# Patient Record
Sex: FEMALE | Race: WHITE | NOT HISPANIC OR LATINO | Employment: FULL TIME | URBAN - METROPOLITAN AREA
[De-identification: names, ages, dates, MRNs, and addresses within clinical notes are randomized per-mention and may not be internally consistent; named-entity substitution may affect disease eponyms.]

---

## 2017-04-06 ENCOUNTER — HOSPITAL ENCOUNTER (EMERGENCY)
Facility: CLINIC | Age: 32
Discharge: HOME | End: 2017-04-06
Attending: EMERGENCY MEDICINE | Admitting: EMERGENCY MEDICINE
Payer: COMMERCIAL

## 2017-04-06 ENCOUNTER — APPOINTMENT (OUTPATIENT)
Dept: CT IMAGING | Facility: CLINIC | Age: 32
End: 2017-04-06
Attending: EMERGENCY MEDICINE
Payer: COMMERCIAL

## 2017-04-06 VITALS
DIASTOLIC BLOOD PRESSURE: 74 MMHG | WEIGHT: 180 LBS | BODY MASS INDEX: 25.83 KG/M2 | OXYGEN SATURATION: 95 % | HEART RATE: 86 BPM | RESPIRATION RATE: 20 BRPM | TEMPERATURE: 97.8 F | SYSTOLIC BLOOD PRESSURE: 117 MMHG

## 2017-04-06 DIAGNOSIS — J06.9 VIRAL URI: ICD-10-CM

## 2017-04-06 DIAGNOSIS — H10.31 ACUTE CONJUNCTIVITIS OF RIGHT EYE, UNSPECIFIED ACUTE CONJUNCTIVITIS TYPE: ICD-10-CM

## 2017-04-06 PROCEDURE — 99284 EMERGENCY DEPT VISIT MOD MDM: CPT | Mod: 25

## 2017-04-06 PROCEDURE — 70480 CT ORBIT/EAR/FOSSA W/O DYE: CPT

## 2017-04-06 RX ORDER — CIPROFLOXACIN HYDROCHLORIDE 3.5 MG/ML
1 SOLUTION/ DROPS TOPICAL
Qty: 1 BOTTLE | Refills: 0 | Status: SHIPPED | OUTPATIENT
Start: 2017-04-06 | End: 2017-04-13

## 2017-04-06 ASSESSMENT — ENCOUNTER SYMPTOMS
EYE DISCHARGE: 1
SHORTNESS OF BREATH: 0
SINUS PRESSURE: 1
FEVER: 1
HEADACHES: 1
SORE THROAT: 1
COUGH: 1
EYE PAIN: 0

## 2017-04-06 NOTE — ED AVS SNAPSHOT
[facility] Emergency Department    [address]    [facility] [address]    Phone:  [phone]    Fax:  [phone]                                       Anuradha Llanos   MRN: [id number]    Department:  [facility] Emergency Department   Date of Visit:  4/6/2017           Patient Information     Date Of Birth          1985        Your diagnoses for this visit were:     Viral URI     Acute conjunctivitis of right eye, unspecified acute conjunctivitis type        You were seen by Mayito Muller MD.      Follow-up Information     Follow up with Melissa Dallas MD.    Specialty:  OB/Gyn    Why:  As needed    Contact information:    [facility]  [address]  [place] [address]  [phone]          Follow up with [facility] Emergency Department.    Specialty:  EMERGENCY MEDICINE    Why:  if worsening pain, vision change, double vision, or severe pain with eye movements    Contact information:    [address] [facility] [address] [phone]        Discharge Instructions         Conjunctivitis, Nonspecific    The membrane that covers the white part of your eye (the conjunctiva) is inflamed. Inflammation happens when your body responds to an injury, allergic reaction, infection, or illness. Symptoms of inflammation in the eye may include redness, irritation, itching, swelling, or burning. These symptoms should go away within the next 24 hours. Conjunctivitis may be related to a particle that was in your eye. If so, it may wash out with your tears or irrigation treatment. Being exposed to liquid chemicals or fumes may also cause this reaction.   Home care    Apply a cold pack (ice in a plastic bag, wrapped in a towel) over the eye for 20 minutes at a time. This will reduce pain.    Artificial tears may be prescribed to reduce irritation or redness.  These should be used 3 to 4 times a day.    You may use acetaminophen or  ibuprofen to control pain, unless another medicine was prescribed.(Note: If you have chronic liver or kidney disease, or if you have ever had a stomach ulcer or gastrointestinal bleeding, talk with your healthcare provider before using these medicines.)  Follow-up care  Follow up with your healthcare provider, or as advised.  When to seek medical advice  Call your healthcare provider right away if any of these occur:    Increased eyelid swelling    Increased eye pain    Increased redness or drainage from the eye    Increased blurry vision or increased sensitivity to light    Failure of normal vision to return within 24 to 48 hours    6550-0880 [employer] [address] [place] [address]. All rights reserved. This information is not intended as a substitute for professional medical care. Always follow your healthcare professional's instructions.          Discharge References/Attachments     URI, VIRAL, NO ABX (ADULT) (ENGLISH)      24 Hour Appointment Hotline       To make an appointment at any [facility], call [phone] ([phone]). If you don't have a family doctor or clinic, we will help you find one. [facility] clinics are conveniently located to serve the needs of you and your family.             Review of your medicines      START taking        Dose / Directions Last dose taken    ciprofloxacin 0.3 % ophthalmic solution   Commonly known as:  CILOXAN   Dose:  1 drop   Quantity:  1 Bottle        Apply 1 drop to eye every 4 hours (while awake) for 7 days   Refills:  0          Our records show that you are taking the medicines listed below. If these are incorrect, please call your family doctor or clinic.        Dose / Directions Last dose taken    polyethylene glycol Packet   Commonly known as:  MIRALAX/GLYCOLAX   Dose:  1 packet        Take 1 packet by mouth daily   Refills:  0        PRENATAL PO        Refills:  0                Prescriptions were sent or printed at these  locations (1 Prescription)                   Other Prescriptions                Printed at Department/Unit printer (1 of 1)         ciprofloxacin (CILOXAN) 0.3 % ophthalmic solution                Procedures and tests performed during your visit     CT Temporal Orbital Sella w/o Contrast      Orders Needing Specimen Collection     None      Pending Results     No orders found from 4/4/2017 to 4/7/2017.            Pending Culture Results     No orders found from 4/4/2017 to 4/7/2017.            Test Results From Your Hospital Stay        4/6/2017 10:55 PM      Narrative     CT OF THE ORBITS AND FACE WITHOUT CONTRAST   4/6/2017 10:23 PM     HISTORY: Pain behind right eye.    TECHNIQUE:  Axial scans and coronal reformations without IV nonionic  iodinated contrast material.    COMPARISON: None.    FINDINGS: There is minimal scattered mucosal thickening in the ethmoid  air cells and maxillary sinuses bilaterally but there is no evidence  for acute sinusitis. There are no fractures of the visualized bones.  The orbits bilaterally are unremarkable with no evidence for mass,  fluid collection or inflammation. The globes bilaterally are  unremarkable.        Impression     IMPRESSION:  1. Minimal scattered mucosal thickening in the ethmoid and maxillary  sinuses on both sides but no evidence for acute sinusitis.  2. Otherwise, normal orbit CT.    Radiation dose for this scan was reduced using automated exposure  control, adjustment of the mA and/or kV according to patient size, or  iterative reconstruction technique.     PRAVIN TELLEZ MD                Clinical Quality Measure: Blood Pressure Screening     Your blood pressure was checked while you were in the emergency department today. The last reading we obtained was  BP: (!) 148/100 . Please read the guidelines below about what these numbers mean and what you should do about them.  If your systolic blood pressure (the top number) is less than 120 and your diastolic blood  "pressure (the bottom number) is less than 80, then your blood pressure is normal. There is nothing more that you need to do about it.  If your systolic blood pressure (the top number) is 120-139 or your diastolic blood pressure (the bottom number) is 80-89, your blood pressure may be higher than it should be. You should have your blood pressure rechecked within a year by a primary care provider.  If your systolic blood pressure (the top number) is 140 or greater or your diastolic blood pressure (the bottom number) is 90 or greater, you may have high blood pressure. High blood pressure is treatable, but if left untreated over time it can put you at risk for heart attack, stroke, or kidney failure. You should have your blood pressure rechecked by a primary care provider within the next 4 weeks.  If your provider in the emergency department today gave you specific instructions to follow-up with your doctor or provider even sooner than that, you should follow that instruction and not wait for up to 4 weeks for your follow-up visit.        Thank you for choosing [facility]       Thank you for choosing [facility] for your care. Our goal is always to provide you with excellent care. Hearing back from our patients is one way we can continue to improve our services. Please take a few minutes to complete the written survey that you may receive in the mail after you visit with us. Thank you!        [employer] Information     [employer] lets you send messages to your doctor, view your test results, renew your prescriptions, schedule appointments and more. To sign up, go to [email or web address] . Click on \"Log in\" on the left side of the screen, which will take you to the Welcome page. Then click on \"Sign up Now\" on the right side of the page.     You will be asked to enter the access code listed below, as well as some personal information. Please follow the directions to create your username and password.     Your access code is: " [email or web address]  Expires: 2017 11:01 PM     Your access code will  in 90 days. If you need help or a new code, please call your [facility] or [phone].        Care EveryWhere ID     This is your Care EveryWhere ID. This could be used by other organizations to access your [facility] medical records  [id number]        After Visit Summary       This is your record. Keep this with you and show to your community pharmacist(s) and doctor(s) at your next visit.

## 2017-04-06 NOTE — ED AVS SNAPSHOT
[facility] Emergency Department    [address]    [facility] [address]    Phone:  [phone]    Fax:  [phone]                                       Anuradha Llanos   MRN: [id number]    Department:  [facility] Emergency Department   Date of Visit:  4/6/2017           After Visit Summary Signature Page     I have received my discharge instructions, and my questions have been answered. I have discussed any challenges I see with this plan with the nurse or doctor.    ..........................................................................................................................................  Patient/Patient Representative Signature      ..........................................................................................................................................  Patient Representative Print Name and Relationship to Patient    ..................................................               ................................................  Date                                            Time    ..........................................................................................................................................  Reviewed by Signature/Title    ...................................................              ..............................................  Date                                                            Time

## 2017-04-07 NOTE — DISCHARGE INSTRUCTIONS
Conjunctivitis, Nonspecific    The membrane that covers the white part of your eye (the conjunctiva) is inflamed. Inflammation happens when your body responds to an injury, allergic reaction, infection, or illness. Symptoms of inflammation in the eye may include redness, irritation, itching, swelling, or burning. These symptoms should go away within the next 24 hours. Conjunctivitis may be related to a particle that was in your eye. If so, it may wash out with your tears or irrigation treatment. Being exposed to liquid chemicals or fumes may also cause this reaction.   Home care    Apply a cold pack (ice in a plastic bag, wrapped in a towel) over the eye for 20 minutes at a time. This will reduce pain.    Artificial tears may be prescribed to reduce irritation or redness.  These should be used 3 to 4 times a day.    You may use acetaminophen or ibuprofen to control pain, unless another medicine was prescribed.(Note: If you have chronic liver or kidney disease, or if you have ever had a stomach ulcer or gastrointestinal bleeding, talk with your healthcare provider before using these medicines.)  Follow-up care  Follow up with your healthcare provider, or as advised.  When to seek medical advice  Call your healthcare provider right away if any of these occur:    Increased eyelid swelling    Increased eye pain    Increased redness or drainage from the eye    Increased blurry vision or increased sensitivity to light    Failure of normal vision to return within 24 to 48 hours    6716-2651 The [employer]. [address] [place] [address], [place] [address]. All rights reserved. This information is not intended as a substitute for professional medical care. Always follow your healthcare professional's instructions.

## 2017-04-07 NOTE — ED NOTES
Right eye pain and drainage after blowing nose this morning. Patient states she has been sick with a cold since Monday. Patient states she has a headache behind right eye. ABC intact alert and no distress.

## 2017-04-07 NOTE — ED PROVIDER NOTES
History     Chief Complaint:  Eye Problem and Headache    HPI   Anuradha Llanos is a 32 year old female who presents to the emergency department today for evaluation of right eye drainage. Patient reports cold symptoms that began 4 days ago with sore throat, and later congestions, sinus pressure, and cough. Patient also reports particularly significant mucous drainage experienced suddenly after blowing nose that was followed by a severe headache; she notes that eye discharge and headache was not present before blowing her nose. Tylenol last taken at 2000 provided temporary headache relief. Patient notes recent ill contacts with sick kids. Patient reports no chest pain or shortness of breath. She also denies pain with movement of her eye. No other concerns were voiced at this time.     Allergies:  Morphine    Medications:    Miralax    Past Medical History:    Abnormal pap smear  High cholesterol     Past Surgical History:    Colonoscopy  Tonsillectomy  Leep Tx    Family History:    History reviewed. No pertinent family history.     Social History:  The patient was accompanied to the ED by no one.  Smoking Status: Former smoker  Alcohol Use: Negative  Marital Status:   [2]    Review of Systems   Constitutional: Positive for fever.   HENT: Positive for congestion, sinus pressure and sore throat.    Eyes: Positive for discharge. Negative for pain.   Respiratory: Positive for cough. Negative for shortness of breath.    Cardiovascular: Negative for chest pain.   Neurological: Positive for headaches.   All other systems reviewed and are negative.    Physical Exam   First Vitals:  BP: (!) 148/100  Pulse: 86  Temp: 97.8  F (36.6  C)  Resp: 20  Weight: 81.6 kg (180 lb)  SpO2: 100 %    Physical Exam  Vital signs and nursing notes reviewed.     Constitutional: laying on gurney appears mild to moderately uncomfortable  HENT: Oropharynx is clear and moist. No obvious lesions noted Nasal congestions.   Eyes: Pupils equal.  Right conjunctival ejection. No obvious drainage. Extraocular movements are normal and without any pain.   Neck: normal range of motion. No pain with movement.   Cardiovascular: Normal rate, regular rhythm, normal heart sounds.   Pulmonary/Chest: Effort normal and breath sounds normal. No respiratory distress.   Abdominal: Soft. Bowel sounds are normal. No tenderness to palpation. No rebound or guarding.   Musculoskeletal: No joint swelling or edema.   Neurological: Alert and oriented. No focal weakness  Skin: Skin is warm and dry. No rash noted.   Psych: normal affect    Emergency Department Course     Imaging:  Radiology findings were communicated with the patient who voiced understanding of the findings.    CT temporal orbital sella w/o contrast:  1. Minimal scattered mucosal thickening in the ethmoid and maxillary  sinuses on both sides but no evidence for acute sinusitis.  2. Otherwise, normal orbit CT.  Reading per radiology     Emergency Department Course:  Nursing notes and vitals reviewed.  I performed an exam of the patient as documented above.   The patient was sent for a CT temporal sella while in the emergency department, results above.     At 2254 the patient was updated on imaging results and we discussed plan of care.     I personally reviewed the treatment plan  with the Patient and answered all related questions prior to discharge.    Impression & Plan      Medical Decision Making:  Anuradha Llanos is a 32 year old female who presents today with symptoms after blowing her nose with significant force, developing sudden pain behind her right. It sounds like she developed viral URI symptoms for the last couple of days. She also developed redness to the right eye with drainage after this episode. Patient did have findings of right eye conjunctivitis. There are no signs of proptosis or pain with movement of the eyes. She had normal vision and pupillary response. Due to the acute onset, nature, and  description of her pain I wanted to make sure that there is nothing retroorbital causing these symptoms. Therefore CT of the orbit was obtained. Thankfully this did not shows any significant abnormalities. There is some mild ethmoid and sphenoid sinus inflammation. I discussed with her at length about her findings and symptoms. I recommended that she not blow her nose and that oral antibiotics are not warranted at this time. She is to watch for increased redness or swelling around the eyes or if she notes any pain with looking in different directions. She is to return immediately. She is given Cipro eye drops for her conjunctivitis and she was advised to have good handwashing.     Diagnosis:    ICD-10-CM    1. Viral URI J06.9     B97.89    2. Acute conjunctivitis of right eye, unspecified acute conjunctivitis type H10.31        Disposition:   Discharged to home. Plan for follow up with Melissa Dallas    Discharge Medications:  New Prescriptions    CIPROFLOXACIN (CILOXAN) 0.3 % OPHTHALMIC SOLUTION    Apply 1 drop to eye every 4 hours (while awake) for 7 days       Scribe Disclosure:  I, Rodriguez Antunez, am serving as a scribe at 10:00 PM on 4/6/2017 to document services personally performed by Mayito Muller MD, based on my observations and the provider's statements to me.  [facility] EMERGENCY DEPARTMENT       Mayito Muller MD  04/07/17 [id number]

## 2017-04-11 ENCOUNTER — CARE COORDINATION (OUTPATIENT)
Dept: CASE MANAGEMENT | Facility: CLINIC | Age: 32
End: 2017-04-11

## 2018-04-11 LAB
HBV SURFACE AG SERPL QL IA: NORMAL
HIV 1+2 AB+HIV1 P24 AG SERPL QL IA: NORMAL
RUBELLA ABY IGG: NORMAL

## 2018-10-10 LAB
GROUP B STREP PCR: NEGATIVE

## 2018-11-14 ENCOUNTER — HOSPITAL ENCOUNTER (INPATIENT)
Facility: CLINIC | Age: 33
LOS: 2 days | Discharge: HOME | End: 2018-11-16
Attending: OBSTETRICS & GYNECOLOGY | Admitting: OBSTETRICS & GYNECOLOGY
Payer: COMMERCIAL

## 2018-11-14 LAB
ABO + RH BLD: ABNORMAL
BLD GP AB INVEST PLASRBC-IMP: ABNORMAL
BLD GP AB SCN SERPL QL: ABNORMAL
BLD GP AB SCN SERPL QL: ABNORMAL
BLD PROD TYP BPU: ABNORMAL
BLOOD BANK CMNT PATIENT-IMP: ABNORMAL
BLOOD BANK CMNT PATIENT-IMP: NORMAL
HGB BLD-MCNC: 12.6 G/DL (ref 11.7–15.7)
NUM BPU REQUESTED: 2
SPECIMEN EXP DATE BLD: ABNORMAL
SPECIMEN EXP DATE BLD: ABNORMAL

## 2018-11-14 PROCEDURE — 12000027 ZZH R&B OB

## 2018-11-14 PROCEDURE — 25000128 H RX IP 250 OP 636: Performed by: OBSTETRICS & GYNECOLOGY

## 2018-11-14 PROCEDURE — 10907ZC DRAINAGE OF AMNIOTIC FLUID, THERAPEUTIC FROM PRODUCTS OF CONCEPTION, VIA NATURAL OR ARTIFICIAL OPENING: ICD-10-PCS | Performed by: OBSTETRICS & GYNECOLOGY

## 2018-11-14 PROCEDURE — 36415 COLL VENOUS BLD VENIPUNCTURE: CPT | Performed by: OBSTETRICS & GYNECOLOGY

## 2018-11-14 PROCEDURE — 25000132 ZZH RX MED GY IP 250 OP 250 PS 637

## 2018-11-14 PROCEDURE — 85018 HEMOGLOBIN: CPT | Performed by: OBSTETRICS & GYNECOLOGY

## 2018-11-14 PROCEDURE — 0KQM0ZZ REPAIR PERINEUM MUSCLE, OPEN APPROACH: ICD-10-PCS | Performed by: OBSTETRICS & GYNECOLOGY

## 2018-11-14 PROCEDURE — 86900 BLOOD TYPING SEROLOGIC ABO: CPT | Performed by: OBSTETRICS & GYNECOLOGY

## 2018-11-14 PROCEDURE — 86780 TREPONEMA PALLIDUM: CPT | Performed by: OBSTETRICS & GYNECOLOGY

## 2018-11-14 PROCEDURE — 86901 BLOOD TYPING SEROLOGIC RH(D): CPT | Performed by: OBSTETRICS & GYNECOLOGY

## 2018-11-14 PROCEDURE — 25000125 ZZHC RX 250: Performed by: OBSTETRICS & GYNECOLOGY

## 2018-11-14 PROCEDURE — 25000132 ZZH RX MED GY IP 250 OP 250 PS 637: Performed by: OBSTETRICS & GYNECOLOGY

## 2018-11-14 PROCEDURE — 86850 RBC ANTIBODY SCREEN: CPT | Performed by: OBSTETRICS & GYNECOLOGY

## 2018-11-14 PROCEDURE — 86870 RBC ANTIBODY IDENTIFICATION: CPT | Performed by: OBSTETRICS & GYNECOLOGY

## 2018-11-14 PROCEDURE — 86922 COMPATIBILITY TEST ANTIGLOB: CPT | Performed by: OBSTETRICS & GYNECOLOGY

## 2018-11-14 PROCEDURE — 72200001 ZZH LABOR CARE VAGINAL DELIVERY SINGLE

## 2018-11-14 RX ORDER — METHYLERGONOVINE MALEATE 0.2 MG/ML
200 INJECTION INTRAVENOUS
Status: COMPLETED | OUTPATIENT
Start: 2018-11-14 | End: 2018-11-14

## 2018-11-14 RX ORDER — CALCIUM CARBONATE 500 MG/1
1 TABLET, CHEWABLE ORAL 2 TIMES DAILY
COMMUNITY

## 2018-11-14 RX ORDER — AMOXICILLIN 250 MG
1 CAPSULE ORAL 2 TIMES DAILY
Status: DISCONTINUED | OUTPATIENT
Start: 2018-11-14 | End: 2018-11-16 | Stop reason: HOSPADM

## 2018-11-14 RX ORDER — LANOLIN 100 %
OINTMENT (GRAM) TOPICAL
Status: DISCONTINUED | OUTPATIENT
Start: 2018-11-14 | End: 2018-11-16 | Stop reason: HOSPADM

## 2018-11-14 RX ORDER — AMOXICILLIN 250 MG
2 CAPSULE ORAL 2 TIMES DAILY
Status: DISCONTINUED | OUTPATIENT
Start: 2018-11-14 | End: 2018-11-16 | Stop reason: HOSPADM

## 2018-11-14 RX ORDER — MISOPROSTOL 200 UG/1
800 TABLET ORAL ONCE
Status: COMPLETED | OUTPATIENT
Start: 2018-11-14 | End: 2018-11-14

## 2018-11-14 RX ORDER — OXYCODONE AND ACETAMINOPHEN 5; 325 MG/1; MG/1
1 TABLET ORAL
Status: DISCONTINUED | OUTPATIENT
Start: 2018-11-14 | End: 2018-11-14

## 2018-11-14 RX ORDER — OXYTOCIN 10 [USP'U]/ML
10 INJECTION, SOLUTION INTRAMUSCULAR; INTRAVENOUS
Status: DISCONTINUED | OUTPATIENT
Start: 2018-11-14 | End: 2018-11-14

## 2018-11-14 RX ORDER — OXYTOCIN/0.9 % SODIUM CHLORIDE 30/500 ML
100 PLASTIC BAG, INJECTION (ML) INTRAVENOUS CONTINUOUS
Status: DISCONTINUED | OUTPATIENT
Start: 2018-11-14 | End: 2018-11-16 | Stop reason: HOSPADM

## 2018-11-14 RX ORDER — FENTANYL CITRATE 50 UG/ML
50-100 INJECTION, SOLUTION INTRAMUSCULAR; INTRAVENOUS
Status: DISCONTINUED | OUTPATIENT
Start: 2018-11-14 | End: 2018-11-14

## 2018-11-14 RX ORDER — OXYTOCIN/0.9 % SODIUM CHLORIDE 30/500 ML
100-340 PLASTIC BAG, INJECTION (ML) INTRAVENOUS CONTINUOUS PRN
Status: DISCONTINUED | OUTPATIENT
Start: 2018-11-14 | End: 2018-11-14

## 2018-11-14 RX ORDER — IBUPROFEN 800 MG/1
800 TABLET, FILM COATED ORAL
Status: DISCONTINUED | OUTPATIENT
Start: 2018-11-14 | End: 2018-11-14

## 2018-11-14 RX ORDER — IBUPROFEN 800 MG/1
800 TABLET, FILM COATED ORAL EVERY 6 HOURS PRN
Status: DISCONTINUED | OUTPATIENT
Start: 2018-11-14 | End: 2018-11-16 | Stop reason: HOSPADM

## 2018-11-14 RX ORDER — ACETAMINOPHEN 325 MG/1
650 TABLET ORAL EVERY 4 HOURS PRN
Status: DISCONTINUED | OUTPATIENT
Start: 2018-11-14 | End: 2018-11-16 | Stop reason: HOSPADM

## 2018-11-14 RX ORDER — CARBOPROST TROMETHAMINE 250 UG/ML
250 INJECTION, SOLUTION INTRAMUSCULAR
Status: DISCONTINUED | OUTPATIENT
Start: 2018-11-14 | End: 2018-11-14

## 2018-11-14 RX ORDER — MISOPROSTOL 200 UG/1
400 TABLET ORAL
Status: DISCONTINUED | OUTPATIENT
Start: 2018-11-14 | End: 2018-11-16 | Stop reason: HOSPADM

## 2018-11-14 RX ORDER — METHYLERGONOVINE MALEATE 0.2 MG/ML
200 INJECTION INTRAVENOUS
Status: DISCONTINUED | OUTPATIENT
Start: 2018-11-14 | End: 2018-11-16 | Stop reason: HOSPADM

## 2018-11-14 RX ORDER — SODIUM CHLORIDE, SODIUM LACTATE, POTASSIUM CHLORIDE, CALCIUM CHLORIDE 600; 310; 30; 20 MG/100ML; MG/100ML; MG/100ML; MG/100ML
INJECTION, SOLUTION INTRAVENOUS CONTINUOUS
Status: DISCONTINUED | OUTPATIENT
Start: 2018-11-14 | End: 2018-11-14

## 2018-11-14 RX ORDER — OXYTOCIN 10 [USP'U]/ML
10 INJECTION, SOLUTION INTRAMUSCULAR; INTRAVENOUS
Status: DISCONTINUED | OUTPATIENT
Start: 2018-11-14 | End: 2018-11-16 | Stop reason: HOSPADM

## 2018-11-14 RX ORDER — BISACODYL 10 MG
10 SUPPOSITORY, RECTAL RECTAL DAILY PRN
Status: DISCONTINUED | OUTPATIENT
Start: 2018-11-16 | End: 2018-11-16 | Stop reason: HOSPADM

## 2018-11-14 RX ORDER — OXYTOCIN/0.9 % SODIUM CHLORIDE 30/500 ML
340 PLASTIC BAG, INJECTION (ML) INTRAVENOUS CONTINUOUS PRN
Status: DISCONTINUED | OUTPATIENT
Start: 2018-11-14 | End: 2018-11-16 | Stop reason: HOSPADM

## 2018-11-14 RX ORDER — ACETAMINOPHEN 325 MG/1
650 TABLET ORAL EVERY 4 HOURS PRN
Status: DISCONTINUED | OUTPATIENT
Start: 2018-11-14 | End: 2018-11-14

## 2018-11-14 RX ORDER — MISOPROSTOL 200 UG/1
TABLET ORAL
Status: COMPLETED
Start: 2018-11-14 | End: 2018-11-14

## 2018-11-14 RX ORDER — HYDROCORTISONE 2.5 %
CREAM (GRAM) TOPICAL 3 TIMES DAILY PRN
Status: DISCONTINUED | OUTPATIENT
Start: 2018-11-14 | End: 2018-11-16 | Stop reason: HOSPADM

## 2018-11-14 RX ORDER — ONDANSETRON 2 MG/ML
4 INJECTION INTRAMUSCULAR; INTRAVENOUS EVERY 6 HOURS PRN
Status: DISCONTINUED | OUTPATIENT
Start: 2018-11-14 | End: 2018-11-14

## 2018-11-14 RX ORDER — NALOXONE HYDROCHLORIDE 0.4 MG/ML
.1-.4 INJECTION, SOLUTION INTRAMUSCULAR; INTRAVENOUS; SUBCUTANEOUS
Status: DISCONTINUED | OUTPATIENT
Start: 2018-11-14 | End: 2018-11-16 | Stop reason: HOSPADM

## 2018-11-14 RX ORDER — NALOXONE HYDROCHLORIDE 0.4 MG/ML
.1-.4 INJECTION, SOLUTION INTRAMUSCULAR; INTRAVENOUS; SUBCUTANEOUS
Status: DISCONTINUED | OUTPATIENT
Start: 2018-11-14 | End: 2018-11-14

## 2018-11-14 RX ADMIN — MISOPROSTOL 800 MCG: 200 TABLET ORAL at 11:40

## 2018-11-14 RX ADMIN — SENNOSIDES AND DOCUSATE SODIUM 1 TABLET: 8.6; 5 TABLET ORAL at 19:52

## 2018-11-14 RX ADMIN — LIDOCAINE HYDROCHLORIDE 10 ML: 10 INJECTION, SOLUTION EPIDURAL; INFILTRATION; INTRACAUDAL; PERINEURAL at 11:38

## 2018-11-14 RX ADMIN — IBUPROFEN 800 MG: 800 TABLET ORAL at 12:29

## 2018-11-14 RX ADMIN — METHYLERGONOVINE MALEATE 200 MCG: 0.2 INJECTION, SOLUTION INTRAMUSCULAR; INTRAVENOUS at 11:57

## 2018-11-14 NOTE — PLAN OF CARE
Problem: Patient Care Overview  Goal: Plan of Care/Patient Progress Review  Outcome: No Change  Pt is oriented to room by KIMBERLEY Day, no epidural, was moving from w/c to bed well by herself, has had few clots after delivery and white container is in the bathroom for pt to void and catch clots per Dr Zapata, bonding with infant well, family is visiting now.

## 2018-11-14 NOTE — PROGRESS NOTES
male infant    placenta intact  Small 2nd MLtear repaired  QEBL pending  Pt requested no IV  She was given Cytotec 800 mcg intrarectally and due to uterine atony received methergine 0.2mg IM

## 2018-11-14 NOTE — IP AVS SNAPSHOT
MRN:[id number]                      After Visit Summary   11/14/2018    Anuradha Llanos    MRN: [id number]           Thank you!     Thank you for choosing [facility] for your care. Our goal is always to provide you with excellent care. Hearing back from our patients is one way we can continue to improve our services. Please take a few minutes to complete the written survey that you may receive in the mail after you visit. If you would like to speak to someone directly about your visit please contact Patient Relations at [phone]. Thank you!          Patient Information     Date Of Birth          1985        About your hospital stay     You were admitted on:  November 14, 2018 You last received care in the:  [facility]    You were discharged on:  November 16, 2018        Reason for your hospital stay       Maternity care                  Who to Call     For medical emergencies, please call 911.  For non-urgent questions about your medical care, please call your primary care provider or clinic, [phone]          Attending Provider     Provider Specialty    Elyssa Zapata MD OB/Gyn       Primary Care Provider Office Phone # Fax #    Melissa Dallas [phone]      After Care Instructions     Activity       Review discharge instructions            Diet       Resume previous diet            Discharge Instructions - Postpartum visit       Schedule postpartum visit with your provider and return to clinic in 6 weeks.                  Further instructions from your care team       Postpartum Vaginal Delivery Instructions    Activity       Ask family and friends for help when you need it.    Do not place anything in your vagina for 6 weeks.    You are not restricted on other activities, but take it easy for a few weeks to allow your body to recover from delivery.  You are able to do any activities you feel up to that point.    No driving until you have  stopped taking your pain medications (usually two weeks after delivery).     Call your health care provider if you have any of these symptoms:       Increased pain, swelling, redness, or fluid around your stiches from an episiotomy or perineal tear.    A fever above 100.4 F (38 C) with or without chills when placing a thermometer under your tongue.    You soak a sanitary pad with blood within 1 hour, or you see blood clots larger than a golf ball.    Bleeding that lasts more than 6 weeks.    Vaginal discharge that smells bad.    Severe pain, cramping or tenderness in your lower belly area.    A need to urinate more frequently (use the toilet more often), more urgently (use the toilet very quickly), or it burns when you urinate.    Nausea and vomiting.    Redness, swelling or pain around a vein in your leg.    Problems breastfeeding or a red or painful area on your breast.    Chest pain and cough or are gasping for air.    Problems coping with sadness, anxiety, or depression.  If you have any concerns about hurting yourself or the baby, call your provider immediately.     You have questions or concerns after you return home.     Keep your hands clean:  Always wash your hands before touching your perineal area and stitches.  This helps reduce your risk of infection.  If your hands aren't dirty, you may use an alcohol hand-rub to clean your hands. Keep your nails clean and short.        Pending Results     No orders found from 11/12/2018 to 11/15/2018.            Statement of Approval     Ordered          11/16/18 0737  I have reviewed and agree with all the recommendations and orders detailed in this document.  EFFECTIVE NOW     Approved and electronically signed by:  Rema Sheppard MD             Admission Information     Date & Time Provider Department Dept. Phone    11/14/2018 Elyssa Zapata MD [facility] [phone]      Your Vitals Were     Blood Pressure Temperature Respirations Height Weight  "BMI (Body Mass Index)    116/70 97.5  F (36.4  C) (Oral) 16 1.778 m (5' 10\") 88.9 kg (196 lb) 28.12 kg/m2      [employer] Information     [employer] lets you send messages to your doctor, view your test results, renew your prescriptions, schedule appointments and more. To sign up, go to [email or web address] . Click on \"Log in\" on the left side of the screen, which will take you to the Welcome page. Then click on \"Sign up Now\" on the right side of the page.     You will be asked to enter the access code listed below, as well as some personal information. Please follow the directions to create your username and password.     Your access code is: [email or web address]  Expires: 2019  9:12 AM     Your access code will  in 90 days. If you need help or a new code, please call your [facility] clinic or [phone].        Care EveryWhere ID     This is your Care EveryWhere ID. This could be used by other organizations to access your [facility] medical records  [id number]        Equal Access to Services     KARON BURNETTE AH: Hadii ga Flores, wayunida yanet, qaybta kaalmada adexochitl, luis rivas. So [facility] [phone].    ATENCIÓN: Si habla español, tiene a giraldo disposición servicios gratuitos de asistencia lingüística. Satish al [phone].    We comply with applicable federal civil rights laws and [place] laws. We do not discriminate on the basis of race, color, national origin, age, disability, sex, sexual orientation, or gender identity.               Review of your medicines      CONTINUE these medicines which have NOT CHANGED        Dose / Directions    calcium carbonate 500 MG chewable tablet   Commonly known as:  TUMS   Indication:  Heartburn        Dose:  1 chew tab   Take 1 chew tab by mouth 2 times daily   Refills:  0       polyethylene glycol Packet   Commonly known as:  MIRALAX/GLYCOLAX        Dose:  1 packet   Take 1 packet by mouth daily   Refills:  0       PRENATAL PO "        Refills:  0                Protect others around you: Learn how to safely use, store and throw away your medicines at www.disposemymeds.org.             Medication List: This is a list of all your medications and when to take them. Check marks below indicate your daily home schedule. Keep this list as a reference.      Medications           Morning Afternoon Evening Bedtime As Needed    calcium carbonate 500 MG chewable tablet   Commonly known as:  TUMS   Take 1 chew tab by mouth 2 times daily                                polyethylene glycol Packet   Commonly known as:  MIRALAX/GLYCOLAX   Take 1 packet by mouth daily                                PRENATAL PO

## 2018-11-14 NOTE — PROVIDER NOTIFICATION
11/14/18 0746   Provider Notification   Provider Name/Title Dr. Zapata   Method of Notification At Bedside     Updated of pt arrival and hx, Dr. Benny FIERROE 3/80/-2, AROM of clear fluid, discussed POC. Dr. Pelayo declined having IV placed, Dr. Zapata ok with that.

## 2018-11-14 NOTE — PLAN OF CARE
33year old  at 41 weeks gestation presents to L/D for evaluation of IOL d/t post dates. Patient reports passing some mucousy blood last night. Patient reports good fetal movement, denies leaking of fluid, vaginal bleeding, and regular contractions. EFM and toco explained and applied. Health history obtained, physical assessment completed. Will update  and obtain further orders.

## 2018-11-14 NOTE — PLAN OF CARE
Data: Anuradha Llanos transferred to 431 via wheelchair at 1415. Baby transferred via parent's arms.  Action: Receiving unit notified of transfer: Yes. Patient and family notified of room change. Report given to Noreen at 1415. Belongings sent to receiving unit. Accompanied by Registered Nurse. Oriented patient to surroundings. Call light within reach. ID bands double-checked with receiving RN.  Response: Patient tolerated transfer and is stable.

## 2018-11-14 NOTE — IP AVS SNAPSHOT
[facility]    [address]    [facility] [address]    Phone:  [phone]    Fax:  [phone]                                       After Visit Summary   11/14/2018    Anuradha Llanos    MRN: [id number]           After Visit Summary Signature Page     I have received my discharge instructions, and my questions have been answered. I have discussed any challenges I see with this plan with the nurse or doctor.    ..........................................................................................................................................  Patient/Patient Representative Signature      ..........................................................................................................................................  Patient Representative Print Name and Relationship to Patient    ..................................................               ................................................  Date                                   Time    ..........................................................................................................................................  Reviewed by Signature/Title    ...................................................              ..............................................  Date                                               Time          22EPIC Rev 08/18

## 2018-11-14 NOTE — H&P
There has been no significant change in Anuradha's general health status based upon review of the prenatal records, nursing database and exam.    Anuradha is a 33 year old   IUP at 41 weeks admitted for post dates induction. She has had an uncomplicated pregnancy. Had a low lying placenta that resolved. History of macrosomia but uncomplicated vaginal deliveries.    GBS negative  A negative  Received Tdap  Rubella immune    Cx= 3cm/80%/-2  EFW= 9.5 lbs  Category 1 fetal tracing    A: IUP at 41 weeks      H/O macrosomia    P: anticipate       Patient prefers as much non intervention as possible (declines IV)

## 2018-11-15 LAB
ABO + RH BLD: NORMAL
ABO + RH BLD: NORMAL
BLOOD BANK CMNT PATIENT-IMP: NORMAL
DATE RH IMM GL GVN: NORMAL
FETAL CELL SCN BLD QL ROSETTE: NORMAL
HGB BLD-MCNC: 11.8 G/DL (ref 11.7–15.7)
RH IG VIALS RECOM PATIENT: NORMAL
T PALLIDUM AB SER QL: NONREACTIVE

## 2018-11-15 PROCEDURE — 3E0234Z INTRODUCTION OF SERUM, TOXOID AND VACCINE INTO MUSCLE, PERCUTANEOUS APPROACH: ICD-10-PCS | Performed by: OBSTETRICS & GYNECOLOGY

## 2018-11-15 PROCEDURE — 85018 HEMOGLOBIN: CPT | Performed by: OBSTETRICS & GYNECOLOGY

## 2018-11-15 PROCEDURE — 85461 HEMOGLOBIN FETAL: CPT | Performed by: OBSTETRICS & GYNECOLOGY

## 2018-11-15 PROCEDURE — 86900 BLOOD TYPING SEROLOGIC ABO: CPT | Performed by: OBSTETRICS & GYNECOLOGY

## 2018-11-15 PROCEDURE — 25000132 ZZH RX MED GY IP 250 OP 250 PS 637: Performed by: OBSTETRICS & GYNECOLOGY

## 2018-11-15 PROCEDURE — 12000027 ZZH R&B OB

## 2018-11-15 PROCEDURE — 25000128 H RX IP 250 OP 636: Performed by: OBSTETRICS & GYNECOLOGY

## 2018-11-15 PROCEDURE — 86901 BLOOD TYPING SEROLOGIC RH(D): CPT | Performed by: OBSTETRICS & GYNECOLOGY

## 2018-11-15 PROCEDURE — 36415 COLL VENOUS BLD VENIPUNCTURE: CPT | Performed by: OBSTETRICS & GYNECOLOGY

## 2018-11-15 RX ADMIN — SENNOSIDES AND DOCUSATE SODIUM 1 TABLET: 8.6; 5 TABLET ORAL at 09:24

## 2018-11-15 RX ADMIN — HUMAN RHO(D) IMMUNE GLOBULIN 300 MCG: 300 INJECTION, SOLUTION INTRAMUSCULAR at 12:04

## 2018-11-15 NOTE — L&D DELIVERY NOTE
Delivery Date:  2018      Julio C is a 33-year-old  3, now para 3 with an intrauterine pregnancy at 41 weeks, who was admitted to Labor and Delivery for postdates induction.  Upon admission, she was noted to be 3 cm dilated, 80% effaced, -2 station.  Artificial rupture of membranes was performed, clear fluid was noted.  The patient declined IV and also declined Pitocin.  She preferred as little intervention as possible.  The category 1 fetal tracing was noted.  Estimated fetal weight was approximately 9-1/2 pounds.  The patient ambulated, then began to have strong regular contractions.  Within a few hours, she was 9 cm dilated and within 15 minutes she was completely dilated.  She pushed through approximately 3 contractions, delivering over a midline tear.  The anterior shoulder delivered easily followed by the rest of the body.  The baby was a male infant who was vigorous and crying upon delivery.  He had Apgars of 9 at 1 minute and 9 at 5 minutes.  His weight is pending.  Delayed cord clamping was performed for 1 minute.  The cord was doubly clamped and ligated by the patient's sister-in-law.  Cord bloods were obtained.  The placenta delivered within 5 minutes. It was noted to be intact with 3 vessels.  The uterus initially became firm with manual massage because the patient did not have an IV.  She was given 800 mcg of Cytotec intrarectally.  However, there was uterine atony and the patient was given additional Methergine 0.2 mg IM.  The QEBL is pending at this time.  Inspection revealed a small second-degree midline tear.  This was infiltrated with 20 cc of 1% lidocaine and repaired in the usual manner using 3-0 chromic suture.  Both the patient and the baby were doing well post-repair.         PAT GALEAS MD             D: 2018   T: 2018   MT: LON      Name:     JULIO C AVENDAÑO   MRN:      [id number]        Account:        [id number]   :      1985        Delivery Date:   11/14/2018               Document: [id number]       cc: Elyssa Zapata MD

## 2018-11-15 NOTE — PLAN OF CARE
Problem: Patient Care Overview  Goal: Plan of Care/Patient Progress Review  Outcome: Improving  ind with self and baby cares. Denies pain, knows tylenol and ibuprofen are available, also offered ice and tucks. Bonding well with baby.

## 2018-11-15 NOTE — PROGRESS NOTES
"November 15, 2018      SUBJECTIVE: No acute overnight events.  Mild abdominal cramping. +Lochia light.  Tolerating PO. Ambulating/urinating w/o difficulty.  Denies CP/palp/SOB/LH.    OBJECTIVE:  /54  Temp 98.1  F (36.7  C) (Oral)  Resp 16  Ht 1.778 m (5' 10\")  Wt 88.9 kg (196 lb)  Breastfeeding? Unknown  BMI 28.12 kg/m2  Gen: NAD, A&O x 3  Abd: Uterus firm, contracted, NT  Ext: minimal edema BL LEs, symmetric, no CT    Hemoglobin   Date Value Ref Range Status   11/15/2018 11.8 11.7 - 15.7 g/dL Final   11/14/2018 12.6 11.7 - 15.7 g/dL Final   ]    A/P: PPD#1 s/p normal vaginal delivery.  Doing well.  Afebrile, VSS.  - ibuprofen, tylenol prn pain  - breastfeeding  - regular diet as tolerated  - routine postpartum care  - discharge home tomorrow AM      ROBBY SANTACRUZ MD    "

## 2018-11-15 NOTE — PROVIDER NOTIFICATION
11/14/18 1851   Provider Notification   Provider Name/Title Dr. Zapata   Method of Notification Phone   Notification Reason Other     Lab called for Rhogam order. Dr. aZpata notified and Order received by telephone . See Mar for Rhogam due tomorrow.

## 2018-11-15 NOTE — PLAN OF CARE
Problem: Patient Care Overview  Goal: Plan of Care/Patient Progress Review  Pt doing well this shift.  Pt denies pain, independent with all cares for self and infant.

## 2018-11-15 NOTE — PLAN OF CARE
Problem: Patient Care Overview  Goal: Plan of Care/Patient Progress Review  Outcome: Improving    Pt . Is up and voiding without difficulty. No clots on white hat after voiding  No free flow, no clots  with fundal massage.  Denies pain and  Declines pain med when offered.

## 2018-11-16 VITALS
DIASTOLIC BLOOD PRESSURE: 70 MMHG | HEIGHT: 70 IN | RESPIRATION RATE: 16 BRPM | WEIGHT: 196 LBS | TEMPERATURE: 97.5 F | SYSTOLIC BLOOD PRESSURE: 116 MMHG | BODY MASS INDEX: 28.06 KG/M2

## 2018-11-16 PROCEDURE — 25000132 ZZH RX MED GY IP 250 OP 250 PS 637: Performed by: OBSTETRICS & GYNECOLOGY

## 2018-11-16 RX ADMIN — SENNOSIDES AND DOCUSATE SODIUM 1 TABLET: 8.6; 5 TABLET ORAL at 07:59

## 2018-11-16 NOTE — PLAN OF CARE
Problem: Patient Care Overview  Goal: Plan of Care/Patient Progress Review  Outcome: No Change  VSS. Pt independent with cares, no complaints of pain. Breastfeeding with a good latch score, ambulating in room. Fundus firm. Bonding well with baby. Reviewed discharge education, all questions answered. Discharged to home at 0939

## 2018-11-16 NOTE — PROGRESS NOTES
[facility]   Obstetrics Progress Note    Subjective: This is the patient's second day since Vaginal delivery. She is doing well.  She is urinating on her own. Pain is controlled with medication.    Objective:   All vitals stable  Temp: 98.1  F (36.7  C) Temp src: Oral BP: 113/47   Heart Rate: 60 Resp: 16          EXAM:  Constitutional: healthy, alert, no distress.   Abdomen: Abdomen soft, non-tender. BS normal. No masses, fundus is firm.  JOINT/EXTREMITIES: extremities normal     Last hemoglobin was   Hemoglobin   Date Value Ref Range Status   11/15/2018 11.8 11.7 - 15.7 g/dL Final   ]    Assessment: Stable postpartum course.    Plan: Routine care. Ambulation encouraged  Breast feeding strategies discussed  Pain control measures as needed  Reportable signs and symptoms dicussed with the patient  Discharge later today  Discussed s/s of domenic Sheppard

## 2018-11-16 NOTE — PLAN OF CARE
Problem: Patient Care Overview  Goal: Plan of Care/Patient Progress Review  Outcome: Improving  ind with self and baby cares. Continues to deny pain. Bonding well with baby.

## 2018-11-16 NOTE — PLAN OF CARE
Problem: Patient Care Overview  Goal: Plan of Care/Patient Progress Review  Outcome: Improving  Vitals stable and afebrile. Caring for self and baby independently. Resting between cares.

## 2018-11-18 LAB
BLD PROD TYP BPU: NORMAL
BLD PROD TYP BPU: NORMAL
BLD UNIT ID BPU: 0
BLD UNIT ID BPU: 0
BLOOD PRODUCT CODE: NORMAL
BLOOD PRODUCT CODE: NORMAL
BPU ID: NORMAL
BPU ID: NORMAL
TRANSFUSION STATUS PATIENT QL: NORMAL

## 2024-01-14 ENCOUNTER — OFFICE VISIT (OUTPATIENT)
Dept: URGENT CARE | Facility: URGENT CARE | Age: 39
End: 2024-01-14
Payer: COMMERCIAL

## 2024-01-14 VITALS
SYSTOLIC BLOOD PRESSURE: 118 MMHG | RESPIRATION RATE: 16 BRPM | DIASTOLIC BLOOD PRESSURE: 76 MMHG | TEMPERATURE: 98 F | OXYGEN SATURATION: 100 % | HEART RATE: 65 BPM

## 2024-01-14 DIAGNOSIS — R07.0 THROAT PAIN: ICD-10-CM

## 2024-01-14 DIAGNOSIS — J02.0 STREPTOCOCCAL PHARYNGITIS: Primary | ICD-10-CM

## 2024-01-14 LAB — DEPRECATED S PYO AG THROAT QL EIA: POSITIVE

## 2024-01-14 PROCEDURE — 87880 STREP A ASSAY W/OPTIC: CPT | Performed by: PHYSICIAN ASSISTANT

## 2024-01-14 PROCEDURE — 99203 OFFICE O/P NEW LOW 30 MIN: CPT | Performed by: PHYSICIAN ASSISTANT

## 2024-01-14 RX ORDER — AMOXICILLIN 500 MG/1
1000 CAPSULE ORAL DAILY
Qty: 20 CAPSULE | Refills: 0 | Status: SHIPPED | OUTPATIENT
Start: 2024-01-14 | End: 2024-01-24

## 2024-01-14 NOTE — PROGRESS NOTES
Assessment/Plan:    Strep positive. No sign of retropharyngeal or peritonsillar abscess. Will prescribe antibiotic.    See patient instructions below.    At the end of the encounter, I discussed results, diagnosis, medications. Discussed red flags for immediate return to clinic/ER, as well as indications for follow up if no improvement. Patient understood and agreed to plan. Patient was stable for discharge.      ICD-10-CM    1. Streptococcal pharyngitis  J02.0 amoxicillin (AMOXIL) 500 MG capsule      2. Throat pain  R07.0 Streptococcus A Rapid Screen w/Reflex to PCR - Clinic Collect            Return in about 3 days (around 1/17/2024) for Follow up w/ primary care provider if not better.    HARVINDER Uriarte, ANDRAE  [facility]  -----------------------------------------------------------------------------------------------------------------------------------------------------    HPI:  Anuardha Llanos is a 38 year old female who presents for evaluation of sore throat, fever, chills, & bilateral ear pressure onset 3 days ago. Tmax 99.9 F. No treatments tried. Patient reports no cough, headache, chest pain, shortness of breath, abdominal pain, nausea, vomiting, diarrhea, rash, or any other symptoms.     Past Medical History:   Diagnosis Date    Abnormal Pap smear     resolved       Vitals:    01/14/24 1243   BP: 118/76   BP Location: Right arm   Patient Position: Sitting   Cuff Size: Adult Regular   Pulse: 65   Resp: 16   Temp: 98  F (36.7  C)   TempSrc: Tympanic   SpO2: 100%       Physical Exam  Vitals and nursing note reviewed.   HENT:      Right Ear: Tympanic membrane normal.      Left Ear: Tympanic membrane normal.      Mouth/Throat:      Mouth: Mucous membranes are moist.      Pharynx: Uvula midline. Posterior oropharyngeal erythema present.      Tonsils: No tonsillar exudate or tonsillar abscesses.   Cardiovascular:      Rate and Rhythm: Normal rate and regular rhythm.      Heart  sounds: Normal heart sounds.   Pulmonary:      Effort: Pulmonary effort is normal.      Breath sounds: Normal breath sounds.   Neurological:      Mental Status: She is alert.       Labs/Imaging:  Results for orders placed or performed in visit on 01/14/24 (from the past 24 hour(s))   Streptococcus A Rapid Screen w/Reflex to PCR - Clinic Collect    Specimen: Throat; Swab   Result Value Ref Range    Group A Strep antigen Positive (A) Negative     No results found for this or any previous visit (from the past 24 hour(s)).        Patient Instructions   Strep positive. No sign of retropharyngeal or peritonsillar abscess. Will prescribe antibiotic.

## 2024-02-18 ENCOUNTER — HEALTH MAINTENANCE LETTER (OUTPATIENT)
Age: 39
End: 2024-02-18

## 2025-03-09 ENCOUNTER — HEALTH MAINTENANCE LETTER (OUTPATIENT)
Age: 40
End: 2025-03-09

## 2025-03-30 ENCOUNTER — HEALTH MAINTENANCE LETTER (OUTPATIENT)
Age: 40
End: 2025-03-30